# Patient Record
Sex: MALE | Race: WHITE | Employment: OTHER | URBAN - METROPOLITAN AREA
[De-identification: names, ages, dates, MRNs, and addresses within clinical notes are randomized per-mention and may not be internally consistent; named-entity substitution may affect disease eponyms.]

---

## 2017-09-15 ENCOUNTER — OFFICE VISIT (OUTPATIENT)
Dept: FAMILY MEDICINE CLINIC | Facility: CLINIC | Age: 42
End: 2017-09-15

## 2017-09-15 VITALS
HEART RATE: 70 BPM | HEIGHT: 72 IN | TEMPERATURE: 98 F | BODY MASS INDEX: 28.44 KG/M2 | OXYGEN SATURATION: 98 % | WEIGHT: 210 LBS | RESPIRATION RATE: 18 BRPM | SYSTOLIC BLOOD PRESSURE: 124 MMHG | DIASTOLIC BLOOD PRESSURE: 70 MMHG

## 2017-09-15 DIAGNOSIS — J30.1 ACUTE SEASONAL ALLERGIC RHINITIS DUE TO POLLEN: Primary | ICD-10-CM

## 2017-09-15 DIAGNOSIS — M54.50 ACUTE LEFT-SIDED LOW BACK PAIN WITHOUT SCIATICA: ICD-10-CM

## 2017-09-15 LAB
APPEARANCE: CLEAR
MULTISTIX LOT#: NORMAL NUMERIC
PH, URINE: 7.5 (ref 4.5–8)
SPECIFIC GRAVITY: 1.02 (ref 1–1.03)
URINE-COLOR: YELLOW
UROBILINOGEN,SEMI-QN: 1 MG/DL (ref 0–1.9)

## 2017-09-15 PROCEDURE — 99213 OFFICE O/P EST LOW 20 MIN: CPT | Performed by: NURSE PRACTITIONER

## 2017-09-15 PROCEDURE — 81003 URINALYSIS AUTO W/O SCOPE: CPT | Performed by: NURSE PRACTITIONER

## 2017-09-15 RX ORDER — FLUTICASONE PROPIONATE 50 MCG
SPRAY, SUSPENSION (ML) NASAL
Qty: 16 G | Refills: 0 | Status: SHIPPED | OUTPATIENT
Start: 2017-09-15 | End: 2017-12-15

## 2017-09-15 NOTE — PATIENT INSTRUCTIONS
Understanding Sinus Problems    You don’t often think about your sinuses until there’s a problem. One day you realize you can’t smell dinner cooking. Or you find you often have headaches or problems breathing through your nose.   Symptoms of sinus problem · Exercise is an important part of recovery from most types of back pain. The muscles behind and in front of the spine support the back.  This means strengthening both the back muscles and the abdominal muscles will provide better support for your spine.  · Be careful if you are given prescription pain medicines, narcotics, or medicine for muscle spasm. They can cause drowsiness, affect your coordination, reflexes, and judgment. Do not drive or operate heavy machinery while taking these types of medicines. The best way to sleep is on your side with your knees bent. Put a low pillow under your head to support your neck in a neutral spine position. Avoid thick pillows that bend your neck to one side.  Put a pillow between your legs to further relax your lower b

## 2017-09-15 NOTE — PROGRESS NOTES
CHIEF COMPLAINT:   Patient presents with:  Sinus Problem: sinus pain and pressure, congestion x 3 days Possible pulled muscle in back x 2 weeks      HPI:   Valeria is a 36year old male who presents for cold symptoms for  3  days.  Pt c/o Pressure in NOSE: nostrils patent, clear nasal mucous, nasal mucosa pale, swollen  THROAT: oral mucosa pink, moist. No visible dental caries. Posterior pharynx is not erythematous. no exudates.   NECK: supple, non-tender  LUNGS: clear to auscultation bilaterally, no wh · Green, yellow, or bloody drainage from the nose  · Trouble tasting food  · Frequent headaches  · Facial pain  · Cough  When sinuses are blocked  If something blocks the passages in the nose or sinuses, mucus can’t drain.  Mucus-filled sinuses often become · Wear quality shoes with sufficient arch support. Foot and ankle alignment can affect back symptoms. Women should avoid wearing high heels. · Therapeutic massage can help relax the back muscles without stretching them.   · During the first 24 to 72 hours · Don’t bend over at the waist to lift an object off the floor.  Instead, bend your knees and hips in a squat.   · Keep your back and head upright  · Hold the object close to your body, directly in front of you.   · Straighten your legs to lift the object.  · Pain becomes worse or spreads to your arms or legs  · Weakness or numbness in one or both arms or legs  · Numbness in the groin area  Date Last Reviewed: 6/1/2016  © 4238-3695 The [address] [place] [address], [address] [place], [place] [address].  [facility]

## 2017-10-18 ENCOUNTER — OFFICE VISIT (OUTPATIENT)
Dept: FAMILY MEDICINE CLINIC | Facility: CLINIC | Age: 42
End: 2017-10-18

## 2017-10-18 VITALS
TEMPERATURE: 100 F | SYSTOLIC BLOOD PRESSURE: 116 MMHG | WEIGHT: 214.81 LBS | RESPIRATION RATE: 18 BRPM | OXYGEN SATURATION: 99 % | HEIGHT: 72 IN | DIASTOLIC BLOOD PRESSURE: 76 MMHG | HEART RATE: 70 BPM | BODY MASS INDEX: 29.1 KG/M2

## 2017-10-18 DIAGNOSIS — R50.9 FEVER AND CHILLS: ICD-10-CM

## 2017-10-18 DIAGNOSIS — J40 BRONCHITIS: Primary | ICD-10-CM

## 2017-10-18 PROCEDURE — 99213 OFFICE O/P EST LOW 20 MIN: CPT | Performed by: NURSE PRACTITIONER

## 2017-10-18 RX ORDER — AZITHROMYCIN 250 MG/1
TABLET, FILM COATED ORAL
Qty: 6 TABLET | Refills: 0 | Status: SHIPPED | OUTPATIENT
Start: 2017-10-18 | End: 2017-12-15

## 2017-10-18 RX ORDER — PREDNISONE 20 MG/1
TABLET ORAL
Qty: 10 TABLET | Refills: 0 | Status: SHIPPED | OUTPATIENT
Start: 2017-10-18 | End: 2017-12-15

## 2017-10-18 RX ORDER — PREDNISONE 20 MG/1
TABLET ORAL
Qty: 10 TABLET | Refills: 0 | Status: SHIPPED | OUTPATIENT
Start: 2017-10-18 | End: 2017-10-18

## 2017-10-18 NOTE — PATIENT INSTRUCTIONS
Bronchitis, Antibiotic Treatment (Adult)    Bronchitis is an infection of the air passages (bronchial tubes) in your lungs. It often occurs when you have a cold.  This illness is contagious during the first few days and is spread through the air by coughi Follow-up care  Follow up with your healthcare provider, or as advised. If you had an X-ray or ECG (electrocardiogram), a specialist will review it. You will be notified of any new findings that may affect your care.   If you are age 72 or older, or if you

## 2017-10-19 NOTE — PROGRESS NOTES
CHIEF COMPLAINT:   Patient presents with:  Cough: dry cough with no other symptoms present x2 weeks (pt taking otc cough suppressant)    HPI:   Valeria is a 36year old male who presents for a persistent cough which has lasted over 2 weeks.   Cough st EYES: Conjunctiva clear. No scleral icterus. HENT: Atraumatic, normocephalic. TM's clear bilaterally. Nostrils patent, nasal mucosa ispink and non-inflamed. No erythema of the throat. NECK: supple, non-tender. LUNGS: Normal respiratory rate.  Normal · If your symptoms are severe, rest at home for the first 2 to 3 days. When you go back to your usual activities, don't let yourself get too tired. · Do not smoke. Also avoid being exposed to secondhand smoke.   · You may use over-the-counter medicines to · Coughing up increased amounts of colored sputum  · Weakness, drowsiness, headache, facial pain, ear pain, or a stiff neck  Call 911  Call 911 if any of these occur.   · Coughing up blood  · Worsening weakness, drowsiness, headache, or stiff neck  · Troubl

## 2018-03-23 ENCOUNTER — OFFICE VISIT (OUTPATIENT)
Dept: FAMILY MEDICINE CLINIC | Facility: CLINIC | Age: 43
End: 2018-03-23

## 2018-03-23 VITALS
OXYGEN SATURATION: 99 % | RESPIRATION RATE: 16 BRPM | HEART RATE: 66 BPM | HEIGHT: 73 IN | WEIGHT: 215 LBS | SYSTOLIC BLOOD PRESSURE: 112 MMHG | DIASTOLIC BLOOD PRESSURE: 76 MMHG | BODY MASS INDEX: 28.49 KG/M2 | TEMPERATURE: 98 F

## 2018-03-23 DIAGNOSIS — J00 COMMON COLD: Primary | ICD-10-CM

## 2018-03-23 PROCEDURE — 99213 OFFICE O/P EST LOW 20 MIN: CPT | Performed by: NURSE PRACTITIONER

## 2018-03-23 NOTE — PATIENT INSTRUCTIONS
Understanding the Cold Virus  Colds are the most common illness that people get. Most adults get 2 or 3 colds per year, and most children get 5 to 7 colds per year. Colds may be caused by over 200 types of viruses.  The most common of these are rhinovirus You can help reduce the spread of cold viruses. This can help both you and others avoid getting colds. Follow these tips:  · Wash your hands well anytime you may have come into contact with cold viruses. Wash your hands for at least 20 seconds.  When you ca The sinuses are air-filled spaces within the bones of the face. They connect to the inside of the nose. Sinusitis is an inflammation of the tissue lining the sinus cavity.  Sinus inflammation can occur during a cold. It can also be due to allergies to polle · Use acetaminophen or ibuprofen to control pain, unless another pain medicine was prescribed. (If you have chronic liver or kidney disease or ever had a stomach ulcer, talk with your doctor before using these medicines.  Aspirin should never be used in any

## 2018-03-23 NOTE — PROGRESS NOTES
CHIEF COMPLAINT:   Patient presents with:  Sinus Problem: cough, runny nose x2days    HPI:   Omid Quiñonez is a 36year old male who presents for ill symptoms for 2 days.  Patient reports cough, runny nose, sore throat, congestion, denies fever, denies si Neelam Clark is a 36year old male who presents with     Emily was seen today for sinus problem. Diagnoses and all orders for this visit:    Common cold    1.  Common cold  · Common colds are caused by viruses which are not eradicated with antibiotics What are the symptoms of a cold virus? Almost all colds involve a stuffy nose. Other common symptoms include:  · Runny nose  · Sneezing  · Sore throat  · Headache  · Cough  How is a cold treated? Colds usually last 5 to 10 days.  Treatment focuses on reli Colds usually go away by themselves. But it’s not unusual to get another type of infection while you have a cold.  These can include:  · Sinus infection  · Lung infection, such as bronchitis or pneumonia  · Ear infection  If you have asthma or chronic bronc · Over-the-counter decongestants may be used unless a similar medicine was prescribed. Nasal sprays work the fastest. Use one that contains phenylephrine or oxymetazoline. First blow the nose gently. Then use the spray.  Do not use these medicines more ofte © 2374-3136 [employer] [address]. [address] [facility], [address] [place]. All rights reserved. This information is not intended as a substitute for professional medical care.  Always follow your healthcare professional's inst      The patient indica

## 2018-08-06 ENCOUNTER — HOSPITAL ENCOUNTER (EMERGENCY)
Facility: HOSPITAL | Age: 43
Discharge: HOME | End: 2018-08-06
Attending: EMERGENCY MEDICINE
Payer: COMMERCIAL

## 2018-08-06 VITALS
RESPIRATION RATE: 18 BRPM | OXYGEN SATURATION: 98 % | TEMPERATURE: 98 F | HEART RATE: 61 BPM | SYSTOLIC BLOOD PRESSURE: 141 MMHG | HEIGHT: 72 IN | WEIGHT: 215 LBS | DIASTOLIC BLOOD PRESSURE: 94 MMHG | BODY MASS INDEX: 29.12 KG/M2

## 2018-08-06 DIAGNOSIS — S91.311A LACERATION OF RIGHT FOOT, INITIAL ENCOUNTER: Primary | ICD-10-CM

## 2018-08-06 PROCEDURE — 90471 IMMUNIZATION ADMIN: CPT

## 2018-08-06 PROCEDURE — 12001 RPR S/N/AX/GEN/TRNK 2.5CM/<: CPT

## 2018-08-06 PROCEDURE — 99283 EMERGENCY DEPT VISIT LOW MDM: CPT

## 2018-08-07 NOTE — ED PROVIDER NOTES
Patient Seen in: [facility] Emergency Department    History   Patient presents with:  Laceration Abrasion (integumentary)    Stated Complaint: R foot lac    HPI    Patient 59-year-old who cut his right ankle on a broken flowerpot this evening.   No o extremities normally. No focal deficits visualized.        ED Course   Labs Reviewed - No data to display    ED Course as of Aug 06 2233  ------------------------------------------------------------      MDM     PROCEDURE NOTE: LACERATION REPAIR  After dis

## 2019-02-25 ENCOUNTER — OFFICE VISIT (OUTPATIENT)
Dept: FAMILY MEDICINE CLINIC | Facility: CLINIC | Age: 44
End: 2019-02-25
Payer: COMMERCIAL

## 2019-02-25 VITALS
SYSTOLIC BLOOD PRESSURE: 120 MMHG | OXYGEN SATURATION: 99 % | HEART RATE: 58 BPM | BODY MASS INDEX: 28.44 KG/M2 | WEIGHT: 210 LBS | HEIGHT: 72 IN | TEMPERATURE: 98 F | DIASTOLIC BLOOD PRESSURE: 68 MMHG

## 2019-02-25 DIAGNOSIS — M94.0 COSTOCHONDRITIS, ACUTE: Primary | ICD-10-CM

## 2019-02-25 DIAGNOSIS — J00 ACUTE NASOPHARYNGITIS: ICD-10-CM

## 2019-02-25 PROCEDURE — 99213 OFFICE O/P EST LOW 20 MIN: CPT | Performed by: NURSE PRACTITIONER

## 2019-02-25 RX ORDER — IBUPROFEN 800 MG/1
800 TABLET ORAL EVERY 8 HOURS PRN
Qty: 30 TABLET | Refills: 0 | Status: SHIPPED | OUTPATIENT
Start: 2019-02-25 | End: 2019-03-07

## 2019-02-25 NOTE — PROGRESS NOTES
HPI:   Laure Beyer is a 39year old male who presents with ill symptoms for  1  weeks.  Patient reports nasal congestion, mild cough, runny nose, was lifting heavy granite last week and notes some chest tenderness in the center of his chest, present all -     ibuprofen 800 MG Oral Tab; Take 1 tablet (800 mg total) by mouth every 8 (eight) hours as needed for Pain or Fever. Acute nasopharyngitis      Ibuprofen as above. Self care discussed. Medication use and risk/benefit discussed.  Patient is advised t The cause of costochondritis is not completely clear, but it may happen after a chest injury, chest infection, or coughing episode. Some physical activities can sometimes lead to costochondritis.  Large-breasted women may be more likely to have the conditio © 7762-9447 [employer] [address]. [address] [facility], [facility] [place]. All rights reserved. This information is not intended as a substitute for professional medical care. Always follow your healthcare professional's instructions.

## 2019-02-25 NOTE — PATIENT INSTRUCTIONS
· Cold remedies are to relieve symptoms and prevent complications rather than cure infection  · Rest and increased oral fluid intake is advised  · Increase humidity of the air at home; place a cool-mist humidifier in the bedroom  · Frequently wash hands  · as chest X-ray, CT scan, or an ECG. Treating costochondritis  If an underlying cause is found, treatment for that will likely relieve the problem. Costochondritis often goes away on its own. The course of the condition varies from person to person.  It usu

## 2019-05-19 ENCOUNTER — APPOINTMENT (OUTPATIENT)
Dept: GENERAL RADIOLOGY | Age: 44
End: 2019-05-19
Attending: FAMILY MEDICINE
Payer: COMMERCIAL

## 2019-05-19 ENCOUNTER — HOSPITAL ENCOUNTER (OUTPATIENT)
Age: 44
Discharge: HOME | End: 2019-05-19
Attending: FAMILY MEDICINE
Payer: COMMERCIAL

## 2019-05-19 VITALS
OXYGEN SATURATION: 100 % | RESPIRATION RATE: 17 BRPM | HEART RATE: 65 BPM | TEMPERATURE: 98 F | DIASTOLIC BLOOD PRESSURE: 86 MMHG | SYSTOLIC BLOOD PRESSURE: 126 MMHG

## 2019-05-19 DIAGNOSIS — S60.511A ABRASION OF RIGHT HAND, INITIAL ENCOUNTER: ICD-10-CM

## 2019-05-19 DIAGNOSIS — S62.336A CLOSED DISPLACED FRACTURE OF NECK OF FIFTH METACARPAL BONE OF RIGHT HAND, INITIAL ENCOUNTER: Primary | ICD-10-CM

## 2019-05-19 PROCEDURE — 73130 X-RAY EXAM OF HAND: CPT | Performed by: FAMILY MEDICINE

## 2019-05-19 PROCEDURE — 99204 OFFICE O/P NEW MOD 45 MIN: CPT

## 2019-05-19 PROCEDURE — 99214 OFFICE O/P EST MOD 30 MIN: CPT

## 2019-05-19 NOTE — ED INITIAL ASSESSMENT (HPI)
C/O right hand pain that occurred when he tried to close a door and his hand slipped hitting it on the concerete.

## 2019-05-19 NOTE — ED PROVIDER NOTES
Patient Seen in: [facility] END    History   Patient presents with:  Hand Pain    Stated Complaint: r hand injury    HPI  34-year-old gentleman with history of injury to his right hand earlier today, patient states he was closing hi present. Cardiovascular: Normal rate, regular rhythm, normal heart sounds and intact distal pulses. Pulmonary/Chest: Effort normal and breath sounds normal. No respiratory distress. Abdominal: Soft. Bowel sounds are normal. There is no tenderness.

## 2019-06-20 PROBLEM — S62.316D CLOSED DISPLACED FRACTURE OF BASE OF FIFTH METACARPAL BONE OF RIGHT HAND WITH ROUTINE HEALING, SUBSEQUENT ENCOUNTER: Status: ACTIVE | Noted: 2019-06-20

## 2020-02-10 ENCOUNTER — OFFICE VISIT (OUTPATIENT)
Dept: FAMILY MEDICINE CLINIC | Facility: CLINIC | Age: 45
End: 2020-02-10
Payer: COMMERCIAL

## 2020-02-10 VITALS
HEIGHT: 72 IN | BODY MASS INDEX: 27.77 KG/M2 | TEMPERATURE: 99 F | HEART RATE: 78 BPM | OXYGEN SATURATION: 99 % | SYSTOLIC BLOOD PRESSURE: 114 MMHG | WEIGHT: 205 LBS | DIASTOLIC BLOOD PRESSURE: 80 MMHG | RESPIRATION RATE: 16 BRPM

## 2020-02-10 DIAGNOSIS — J11.1 INFLUENZA-LIKE ILLNESS: Primary | ICD-10-CM

## 2020-02-10 PROCEDURE — 99213 OFFICE O/P EST LOW 20 MIN: CPT | Performed by: NURSE PRACTITIONER

## 2020-02-10 NOTE — PROGRESS NOTES
CHIEF COMPLAINT:   No chief complaint on file. HPI:   Luis Angel Navarro is a 38year old male who presents for upper respiratory symptoms for  5 days.  Patient reports sore throat only at the beginning of sx's, congestion, fever with Tmax to 101, dry cou LUNGS: clear to auscultation bilaterally, no wheezes or rhonchi. Breathing is non labored. CARDIO: RRR without murmur  EXTREMITIES: no cyanosis, clubbing or edema  LYMPH:  no lymphadenopathy.         ASSESSMENT AND PLAN:   Neelam Clark is a 38year old Follow these guidelines for taking care of yourself at home:  · If symptoms are severe, rest at home for the first 2 to 3 days. · Stay away from cigarette smoke - both your smoke and the smoke from others.   · You may use over-the-counter acetaminophen or · Chest pain, shortness of breath, wheezing, or trouble breathing  · Severe headache; face, neck, or ear pain  · Severe, constant pain in the lower right side of your belly (abdominal)  · Continued vomiting (can’t keep liquids down)  · Frequent diarrhea (m

## 2020-02-10 NOTE — PATIENT INSTRUCTIONS
Viral Syndrome (Adult)  A viral illness may cause a number of symptoms such as fever. Other symptoms depend on the part of the body that the virus affects.  If it settles in your nose, throat, and lungs, it may cause cough, sore throat, congestion, runny · Your appetite may be poor, so a light diet is fine. Avoid dehydration by drinking 8 to 12, 8-ounce glasses of fluids each day.  This may include water; orange juice; lemonade; apple, grape, and cranberry juice; clear fruit drinks; electrolyte replacement © 8651-4118 [employer] [address]. [address] [facility] [place], [facility] [place]. All rights reserved. This information is not intended as a substitute for professional medical care. Always follow your healthcare professional's instructions.

## 2020-03-03 ENCOUNTER — OFFICE VISIT (OUTPATIENT)
Dept: FAMILY MEDICINE CLINIC | Facility: CLINIC | Age: 45
End: 2020-03-03
Payer: COMMERCIAL

## 2020-03-03 VITALS
RESPIRATION RATE: 18 BRPM | WEIGHT: 209.63 LBS | DIASTOLIC BLOOD PRESSURE: 80 MMHG | SYSTOLIC BLOOD PRESSURE: 110 MMHG | HEIGHT: 72 IN | OXYGEN SATURATION: 99 % | BODY MASS INDEX: 28.39 KG/M2 | HEART RATE: 85 BPM | TEMPERATURE: 102 F

## 2020-03-03 DIAGNOSIS — J10.1 INFLUENZA A: Primary | ICD-10-CM

## 2020-03-03 DIAGNOSIS — R68.89 FLU-LIKE SYMPTOMS: ICD-10-CM

## 2020-03-03 LAB
POCT INFLUENZA A: POSITIVE
POCT INFLUENZA B: NEGATIVE

## 2020-03-03 PROCEDURE — 99213 OFFICE O/P EST LOW 20 MIN: CPT | Performed by: NURSE PRACTITIONER

## 2020-03-03 PROCEDURE — 87502 INFLUENZA DNA AMP PROBE: CPT | Performed by: NURSE PRACTITIONER

## 2020-03-03 NOTE — PROGRESS NOTES
Patient presents with:  Cold: headache, cough, pnd, bl ear pressure, bodyaches, chills, x last night       HPI:   Nikki Iglesias is a 38year old male who presents for upper respiratory symptoms for  1  days. Started suddenly. Getting worse.  Feeling feve no apparent distress, acutely sick. SKIN: no rashes,no suspicious lesions, flushed. Warm to touch. EYES:PERRLA, EOMI, normal optic disk,conjunctiva are clear  HEENT: atraumatic, normocephalic,TM wnl, erythema of the throat.   NECK: supple,no adenopathy,no

## 2021-09-18 ENCOUNTER — HOSPITAL ENCOUNTER (EMERGENCY)
Facility: HOSPITAL | Age: 46
Discharge: HOME | End: 2021-09-18
Attending: EMERGENCY MEDICINE
Payer: COMMERCIAL

## 2021-09-18 VITALS
DIASTOLIC BLOOD PRESSURE: 96 MMHG | HEIGHT: 72 IN | HEART RATE: 54 BPM | TEMPERATURE: 97 F | WEIGHT: 210 LBS | RESPIRATION RATE: 16 BRPM | BODY MASS INDEX: 28.44 KG/M2 | OXYGEN SATURATION: 96 % | SYSTOLIC BLOOD PRESSURE: 149 MMHG

## 2021-09-18 DIAGNOSIS — S01.01XA SCALP LACERATION, INITIAL ENCOUNTER: Primary | ICD-10-CM

## 2021-09-18 PROCEDURE — 99282 EMERGENCY DEPT VISIT SF MDM: CPT

## 2021-09-18 PROCEDURE — 12001 RPR S/N/AX/GEN/TRNK 2.5CM/<: CPT

## 2021-09-19 NOTE — ED PROVIDER NOTES
Patient Seen in: [facility] Emergency Department      History   Patient presents with:  Laceration/Abrasion    Stated Complaint: head lac, no loc    Subjective:   HPI    25-year-old male presents emergency department who hit his head on top of a gol sani-cloth germicidal wipes following the exam.         Vital signs reviewed  General appearance: Patient is alert and in no acute distress  HEENT: Pupils equal react to light extraocular muscles intact no scleral icterus, mucous membranes are moist, there ER for worsening, concerning, new, or changing/persisting symptoms. I discussed the case with the patient and they had no questions, complaints, or concerns. Patient was comfortable going home.       This note was prepared using [employer] voice recog

## 2021-09-19 NOTE — ED INITIAL ASSESSMENT (HPI)
Pt states he hit his head on the top of a golf cart in [address]. Pt [place] but pt wanted a second opinion. Bleeding controlled, pt did not get stitches.

## 2024-07-16 ENCOUNTER — HOSPITAL ENCOUNTER (OUTPATIENT)
Facility: HOSPITAL | Age: 49
Setting detail: OBSERVATION
Discharge: HOME | End: 2024-07-17
Attending: EMERGENCY MEDICINE | Admitting: INTERNAL MEDICINE
Payer: COMMERCIAL

## 2024-07-16 DIAGNOSIS — I89.1 LYMPHANGITIS: ICD-10-CM

## 2024-07-16 DIAGNOSIS — L03.116 CELLULITIS OF LEFT LOWER EXTREMITY: Primary | ICD-10-CM

## 2024-07-16 LAB
ALBUMIN SERPL-MCNC: 4.5 G/DL (ref 3.2–4.8)
ALBUMIN/GLOB SERPL: 1.5 {RATIO} (ref 1–2)
ALP LIVER SERPL-CCNC: 86 U/L
ALT SERPL-CCNC: 20 U/L
ANION GAP SERPL CALC-SCNC: 5 MMOL/L (ref 0–18)
AST SERPL-CCNC: 15 U/L (ref ?–34)
BASOPHILS # BLD AUTO: 0.07 X10(3) UL (ref 0–0.2)
BASOPHILS NFR BLD AUTO: 1.3 %
BILIRUB SERPL-MCNC: 0.5 MG/DL (ref 0.3–1.2)
BUN BLD-MCNC: 15 MG/DL (ref 9–23)
CALCIUM BLD-MCNC: 9.1 MG/DL (ref 8.7–10.4)
CHLORIDE SERPL-SCNC: 102 MMOL/L (ref 98–112)
CO2 SERPL-SCNC: 29 MMOL/L (ref 21–32)
CREAT BLD-MCNC: 1.18 MG/DL
EGFRCR SERPLBLD CKD-EPI 2021: 75 ML/MIN/1.73M2 (ref 60–?)
EOSINOPHIL # BLD AUTO: 0.42 X10(3) UL (ref 0–0.7)
EOSINOPHIL NFR BLD AUTO: 7.5 %
ERYTHROCYTE [DISTWIDTH] IN BLOOD BY AUTOMATED COUNT: 12.7 %
GLOBULIN PLAS-MCNC: 3 G/DL (ref 2.8–4.4)
GLUCOSE BLD-MCNC: 112 MG/DL (ref 70–99)
HCT VFR BLD AUTO: 43.6 %
HGB BLD-MCNC: 14.8 G/DL
IMM GRANULOCYTES # BLD AUTO: 0.01 X10(3) UL (ref 0–1)
IMM GRANULOCYTES NFR BLD: 0.2 %
LACTATE SERPL-SCNC: 1.2 MMOL/L (ref 0.5–2)
LYMPHOCYTES # BLD AUTO: 1.05 X10(3) UL (ref 1–4)
LYMPHOCYTES NFR BLD AUTO: 18.9 %
MCH RBC QN AUTO: 29.2 PG (ref 26–34)
MCHC RBC AUTO-ENTMCNC: 33.9 G/DL (ref 31–37)
MCV RBC AUTO: 86 FL
MONOCYTES # BLD AUTO: 0.62 X10(3) UL (ref 0.1–1)
MONOCYTES NFR BLD AUTO: 11.1 %
NEUTROPHILS # BLD AUTO: 3.4 X10 (3) UL (ref 1.5–7.7)
NEUTROPHILS # BLD AUTO: 3.4 X10(3) UL (ref 1.5–7.7)
NEUTROPHILS NFR BLD AUTO: 61 %
OSMOLALITY SERPL CALC.SUM OF ELEC: 284 MOSM/KG (ref 275–295)
PLATELET # BLD AUTO: 209 10(3)UL (ref 150–450)
POTASSIUM SERPL-SCNC: 4 MMOL/L (ref 3.5–5.1)
PROT SERPL-MCNC: 7.5 G/DL (ref 5.7–8.2)
RBC # BLD AUTO: 5.07 X10(6)UL
SODIUM SERPL-SCNC: 136 MMOL/L (ref 136–145)
WBC # BLD AUTO: 5.6 X10(3) UL (ref 4–11)

## 2024-07-16 PROCEDURE — 87040 BLOOD CULTURE FOR BACTERIA: CPT | Performed by: EMERGENCY MEDICINE

## 2024-07-16 PROCEDURE — 85025 COMPLETE CBC W/AUTO DIFF WBC: CPT

## 2024-07-16 PROCEDURE — 80053 COMPREHEN METABOLIC PANEL: CPT | Performed by: EMERGENCY MEDICINE

## 2024-07-16 PROCEDURE — 83605 ASSAY OF LACTIC ACID: CPT

## 2024-07-16 PROCEDURE — 80053 COMPREHEN METABOLIC PANEL: CPT

## 2024-07-16 PROCEDURE — 96372 THER/PROPH/DIAG INJ SC/IM: CPT

## 2024-07-16 PROCEDURE — 83605 ASSAY OF LACTIC ACID: CPT | Performed by: EMERGENCY MEDICINE

## 2024-07-16 PROCEDURE — 96374 THER/PROPH/DIAG INJ IV PUSH: CPT

## 2024-07-16 PROCEDURE — 99285 EMERGENCY DEPT VISIT HI MDM: CPT

## 2024-07-16 PROCEDURE — 36415 COLL VENOUS BLD VENIPUNCTURE: CPT

## 2024-07-16 PROCEDURE — 85025 COMPLETE CBC W/AUTO DIFF WBC: CPT | Performed by: EMERGENCY MEDICINE

## 2024-07-16 RX ORDER — CEPHALEXIN 500 MG/1
500 CAPSULE ORAL EVERY 6 HOURS
COMMUNITY

## 2024-07-16 RX ORDER — HEPARIN SODIUM 5000 [USP'U]/ML
5000 INJECTION, SOLUTION INTRAVENOUS; SUBCUTANEOUS EVERY 8 HOURS SCHEDULED
Status: DISCONTINUED | OUTPATIENT
Start: 2024-07-16 | End: 2024-07-17

## 2024-07-16 RX ORDER — ACETAMINOPHEN 500 MG
500 TABLET ORAL EVERY 4 HOURS PRN
Status: DISCONTINUED | OUTPATIENT
Start: 2024-07-16 | End: 2024-07-17

## 2024-07-16 RX ORDER — SODIUM CHLORIDE 9 MG/ML
INJECTION, SOLUTION INTRAVENOUS CONTINUOUS
Status: DISCONTINUED | OUTPATIENT
Start: 2024-07-16 | End: 2024-07-17

## 2024-07-16 NOTE — ED INITIAL ASSESSMENT (HPI)
Pt presents to ED with complaints of cellulitis to the left leg. Seen at [facility] this weekend and was told to come back if symptoms were worsening. +fever, chills

## 2024-07-16 NOTE — ED PROVIDER NOTES
Patient Seen in: [facility] Emergency Department      History     Chief Complaint   Patient presents with    Cellulitis     Stated Complaint: L leg cellulitis, was at IC yesterday had IM and PO abx, but redness is signifi*    Subjective:   HPI    Patient comes to the emerged part with extension of a soft tissue infection that he has been experiencing over the past day.  The patient was noted to have erythema, tenderness and diagnosed with cellulitis at a local immediate care.  He received an intramuscular injection and oral Keflex, but after a day of Keflex, the infection appears to be spreading proximally in the left lower extremity along with symptoms of chills and aches noted by patient.  Patient has no other acute symptoms.  The level of pain is not severe.    Objective:   History reviewed. No pertinent past medical history.           Past Surgical History:   Procedure Laterality Date    Other Right 2015    bicep repair    Other surgical history  11/2015    Right bicep repair                Social History     Socioeconomic History    Marital status: Single   Tobacco Use    Smoking status: Never    Smokeless tobacco: Never   Substance and Sexual Activity    Alcohol use: Yes     Comment: 3-6 drinks weekly    Drug use: No     Social Determinants of Health     Food Insecurity: No Food Insecurity (7/16/2024)    Food Insecurity     Food Insecurity: Never true   Transportation Needs: No Transportation Needs (7/16/2024)    Transportation Needs     Lack of Transportation: No   Housing Stability: Low Risk  (7/16/2024)    Housing Stability     Housing Instability: No              Review of Systems    Positive for stated complaint: L leg cellulitis, was at IC yesterday had IM and PO abx, but redness is signifi*  Other systems are as noted in HPI.  Constitutional and vital signs reviewed.      All other systems reviewed and negative except as noted above.    Physical Exam     ED Triage Vitals [07/16/24 1505]   BP (!)  150/92   Pulse 71   Resp 16   Temp 98 °F (36.7 °C)   Temp src Oral   SpO2 96 %   O2 Device None (Room air)       Current:/77 (BP Location: Right arm)   Pulse 62   Temp 98.8 °F (37.1 °C) (Oral)   Resp 20   Ht 182.9 cm (6')   Wt 93 kg   SpO2 96%   BMI 27.80 kg/m²         Physical Exam  Vitals and nursing note reviewed.   Constitutional:       Appearance: He is well-developed.   HENT:      Head: Normocephalic.   Cardiovascular:      Rate and Rhythm: Normal rate and regular rhythm.      Heart sounds: Normal heart sounds. No murmur heard.  Pulmonary:      Effort: Pulmonary effort is normal.      Breath sounds: Normal breath sounds.   Abdominal:      General: Bowel sounds are normal.      Palpations: Abdomen is soft.      Tenderness: There is no abdominal tenderness. There is no guarding.   Musculoskeletal:         General: No tenderness. Normal range of motion.      Cervical back: Normal range of motion and neck supple.   Lymphadenopathy:      Cervical: No cervical adenopathy.   Skin:     General: Skin is warm and dry.      Comments: Erythema noted over the left anterior shin consistent with an area of cellulitis.  Previous margins of the cellulitis were noted and there does appear to be some extension of the erythema proximally along the medial aspect of the patient's left lower extremity and patient is noted to have tender femoral nodes in the patient's proximal left thigh.  No fluctuance is noted.  No crepitation is noted.   Neurological:      Mental Status: He is alert and oriented to person, place, and time.      Sensory: No sensory deficit.              ED Course     Labs Reviewed   COMP METABOLIC PANEL (14) - Abnormal; Notable for the following components:       Result Value    Glucose 112 (*)     All other components within normal limits   LACTIC ACID, PLASMA - Normal   CBC WITH DIFFERENTIAL WITH PLATELET    Narrative:     The following orders were created for panel order CBC With Differential With  Platelet.  Procedure                               Abnormality         Status                     ---------                               -----------         ------                     CBC W/ DIFFERENTIAL[[id number]]                              Final result                 Please view results for these tests on the individual orders.   RAINBOW DRAW LAVENDER   RAINBOW DRAW LIGHT GREEN   RAINBOW DRAW BLUE   BLOOD CULTURE   BLOOD CULTURE   CBC W/ DIFFERENTIAL       ED Course as of 07/16/24 2206  ------------------------------------------------------------  Time: 07/16 1749  Value: WBC: 5.6  Comment: (Reviewed)     Medications   sodium chloride 0.9% infusion ( Intravenous New Bag 7/16/24 2103)   heparin (Porcine) 5000 UNIT/ML injection 5,000 Units (5,000 Units Subcutaneous Given 7/16/24 2103)   acetaminophen (Tylenol Extra Strength) tab 500 mg (has no administration in time range)   ceFAZolin (Ancef) 2g in 10mL IV syringe premix (has no administration in time range)   ceFAZolin (Ancef) 2g in 10mL IV syringe premix (0 g Intravenous Stopped 7/16/24 1713)       IV Ancef was initiated.  Differential of necrotizing soft tissue infection was considered, but patient's clinical appearance and appearance of the affected area does not suggest this.         MDM      Patient comes emergency department with left lower extremity cellulitis which appears to be spreading with evidence of proximal lymphangitis and worsening in spite of oral antibiotics.  Patient will be hospitalized for further doses of IV antibiotics.  Admission disposition: 7/16/2024  4:54 PM                                        Medical Decision Making      Disposition and Plan     Clinical Impression:  1. Cellulitis of left lower extremity    2. Lymphangitis         Disposition:  Admit  7/16/2024  4:54 pm    Follow-up:  No follow-up provider specified.        Medications Prescribed:  Current Discharge Medication List                           Hospital Problems        Present on Admission  Date Reviewed: 3/3/2020            ICD-10-CM Noted POA    * (Principal) Cellulitis of left lower extremity L03.116 7/16/2024 Unknown    Lymphangitis I89.1 7/16/2024 Unknown

## 2024-07-17 VITALS
SYSTOLIC BLOOD PRESSURE: 122 MMHG | BODY MASS INDEX: 27.77 KG/M2 | OXYGEN SATURATION: 95 % | DIASTOLIC BLOOD PRESSURE: 78 MMHG | TEMPERATURE: 98 F | RESPIRATION RATE: 16 BRPM | WEIGHT: 205 LBS | HEART RATE: 52 BPM | HEIGHT: 72 IN

## 2024-07-17 LAB
ALBUMIN SERPL-MCNC: 3.9 G/DL (ref 3.2–4.8)
ALBUMIN/GLOB SERPL: 1.5 {RATIO} (ref 1–2)
ALP LIVER SERPL-CCNC: 67 U/L
ALT SERPL-CCNC: 14 U/L
ANION GAP SERPL CALC-SCNC: 3 MMOL/L (ref 0–18)
AST SERPL-CCNC: 14 U/L (ref ?–34)
BASOPHILS # BLD AUTO: 0.06 X10(3) UL (ref 0–0.2)
BASOPHILS NFR BLD AUTO: 1 %
BILIRUB SERPL-MCNC: 0.4 MG/DL (ref 0.3–1.2)
BUN BLD-MCNC: 13 MG/DL (ref 9–23)
CALCIUM BLD-MCNC: 8.8 MG/DL (ref 8.7–10.4)
CHLORIDE SERPL-SCNC: 107 MMOL/L (ref 98–112)
CO2 SERPL-SCNC: 29 MMOL/L (ref 21–32)
CREAT BLD-MCNC: 1.18 MG/DL
CRP SERPL-MCNC: 3.6 MG/DL (ref ?–1)
EGFRCR SERPLBLD CKD-EPI 2021: 75 ML/MIN/1.73M2 (ref 60–?)
EOSINOPHIL # BLD AUTO: 0.52 X10(3) UL (ref 0–0.7)
EOSINOPHIL NFR BLD AUTO: 9 %
ERYTHROCYTE [DISTWIDTH] IN BLOOD BY AUTOMATED COUNT: 12.5 %
GLOBULIN PLAS-MCNC: 2.6 G/DL (ref 2.8–4.4)
GLUCOSE BLD-MCNC: 106 MG/DL (ref 70–99)
HCT VFR BLD AUTO: 40.9 %
HGB BLD-MCNC: 13.9 G/DL
IMM GRANULOCYTES # BLD AUTO: 0.02 X10(3) UL (ref 0–1)
IMM GRANULOCYTES NFR BLD: 0.3 %
LYMPHOCYTES # BLD AUTO: 0.98 X10(3) UL (ref 1–4)
LYMPHOCYTES NFR BLD AUTO: 16.9 %
MCH RBC QN AUTO: 29.3 PG (ref 26–34)
MCHC RBC AUTO-ENTMCNC: 34 G/DL (ref 31–37)
MCV RBC AUTO: 86.1 FL
MONOCYTES # BLD AUTO: 0.81 X10(3) UL (ref 0.1–1)
MONOCYTES NFR BLD AUTO: 14 %
NEUTROPHILS # BLD AUTO: 3.41 X10 (3) UL (ref 1.5–7.7)
NEUTROPHILS # BLD AUTO: 3.41 X10(3) UL (ref 1.5–7.7)
NEUTROPHILS NFR BLD AUTO: 58.8 %
OSMOLALITY SERPL CALC.SUM OF ELEC: 289 MOSM/KG (ref 275–295)
PLATELET # BLD AUTO: 190 10(3)UL (ref 150–450)
POTASSIUM SERPL-SCNC: 4.3 MMOL/L (ref 3.5–5.1)
PROT SERPL-MCNC: 6.5 G/DL (ref 5.7–8.2)
RBC # BLD AUTO: 4.75 X10(6)UL
SODIUM SERPL-SCNC: 139 MMOL/L (ref 136–145)
WBC # BLD AUTO: 5.8 X10(3) UL (ref 4–11)

## 2024-07-17 PROCEDURE — 85025 COMPLETE CBC W/AUTO DIFF WBC: CPT | Performed by: INTERNAL MEDICINE

## 2024-07-17 PROCEDURE — 96376 TX/PRO/DX INJ SAME DRUG ADON: CPT

## 2024-07-17 PROCEDURE — 80053 COMPREHEN METABOLIC PANEL: CPT | Performed by: INTERNAL MEDICINE

## 2024-07-17 PROCEDURE — 86140 C-REACTIVE PROTEIN: CPT | Performed by: INTERNAL MEDICINE

## 2024-07-17 RX ORDER — ACETAMINOPHEN 500 MG
1000 TABLET ORAL EVERY 6 HOURS PRN
Status: SHIPPED | COMMUNITY
Start: 2024-07-17

## 2024-07-17 RX ORDER — IBUPROFEN 200 MG
400 TABLET ORAL EVERY 6 HOURS PRN
Status: SHIPPED | COMMUNITY
Start: 2024-07-17

## 2024-07-17 NOTE — PLAN OF CARE
Patient is alert and oriented x 4. Vitals stable on room air. Denies pain. Tolerating regular diet. Plan for blood cultures pending & IV abx. Plan of care discussed with patient.

## 2024-07-17 NOTE — PROGRESS NOTES
NURSING DISCHARGE NOTE    Discharged Home via Ambulatory.  Accompanied by Family member  Belongings Taken by patient/family.    AVS reviewed with patient and spouse. Reviewed instructions, restrictions, medications and follow up appointments. All questions answered, verbalized understanding.

## 2024-07-17 NOTE — PROGRESS NOTES
Patient admitted via bed from ED. Oriented to room. Safety precautions initiated. Call light in reach.

## 2024-07-17 NOTE — PLAN OF CARE
A&Ox4, VSS on room air. Redness and warmth noted to LLE, mild pitting edema. Denies pain or discomfort. Is able to walk freely with no limitations. Tolerating IV ABX. Possible DC later today pending preliminary results. POC discussed with patient.

## 2024-07-17 NOTE — H&P
[facility]   part of [facility]    History & Physical    Zane Arellano Patient Status:  Observation    1975 MRN [id number]   Location [facility] 3SW-A Attending Susan Bullard MD   Hosp Day # 0 PCP None Pcp     Date:  2024  Date of Admission:  2024    Chief Complaint:     Chief Complaint   Patient presents with    Cellulitis       HPI:   Zane Arellano is a(n) 49 year old male who presented w/ left lower extremity swelling, redness, pain. Onset was , he was doing yard work, unsure if he had a bug bite or got stuck by a thorn. Started having fevers, chills, malaise, then noted worsening redness, swelling, shin pains, started keflex, took 3 doses but noted a red line going up his leg, came to ED, was admitted and started on ancef, erythema, swelling, pain, and red streak up his leg is all improving.     History   History reviewed. No pertinent past medical history.  Past Surgical History:   Procedure Laterality Date    Other Right     bicep repair    Other surgical history  2015    Right bicep repair     History reviewed. No pertinent family history.  Social History:  Social History     Socioeconomic History    Marital status: Single   Tobacco Use    Smoking status: Never    Smokeless tobacco: Never   Substance and Sexual Activity    Alcohol use: Yes     Comment: 3-6 drinks weekly    Drug use: No     Social Determinants of Health     Food Insecurity: No Food Insecurity (2024)    Food Insecurity     Food Insecurity: Never true   Transportation Needs: No Transportation Needs (2024)    Transportation Needs     Lack of Transportation: No   Housing Stability: Low Risk  (2024)    Housing Stability     Housing Instability: No       Fam Hx  - father had brain mass removed and dvt post op     Allergies/Medications:   Allergies:   Allergies   Allergen Reactions    Seasonal      Medications Prior to Admission   Medication Sig    cephalexin 500 MG Oral Cap Take 1  capsule (500 mg total) by mouth every 6 (six) hours. for 10 days       Review of Systems:   A comprehensive 12 point review of systems was otherwise negative, aside from what's stated above.    Physical Exam:   Vital Signs:  Blood pressure 113/66, pulse 50, temperature 98.5 °F (36.9 °C), temperature source Oral, resp. rate 20, height 6' (1.829 m), weight 205 lb (93 kg), SpO2 94%.     General: No acute distress. Alert and oriented x 3.  HEENT: Moist mucous membranes. EOM-I. PERRL  Neck: No lymphadenopathy.  No JVD. No carotid bruits.  Respiratory: Clear to auscultation bilaterally.  No wheezes. No rhonchi.  Cardiovascular: S1, S2.  Regular rate and rhythm.  No murmurs. Equal pulses   Abdomen: Soft, nontender, nondistended.  Positive bowel sounds. No rebound tenderness  Neurologic: No focal neurological deficits.  Musculoskeletal: left shin erythema retracting from border, min ttp, no fluctuance   Integument: No lesions. No erythema.  Psychiatric: Appropriate mood and affect.    Results:     Lab Results   Component Value Date    WBC 5.8 07/17/2024    HGB 13.9 07/17/2024    HCT 40.9 07/17/2024    .0 07/17/2024    CREATSERUM 1.18 07/17/2024    BUN 13 07/17/2024     07/17/2024    K 4.3 07/17/2024     07/17/2024    CO2 29.0 07/17/2024     (H) 07/17/2024    CA 8.8 07/17/2024    ALB 3.9 07/17/2024    ALKPHO 67 07/17/2024    BILT 0.4 07/17/2024    TP 6.5 07/17/2024    AST 14 07/17/2024    ALT 14 07/17/2024    CRP 3.60 (H) 07/17/2024            No results found.        Assessment/Plan:     Zane Arellano is a(n) 49 year old male who presented w/ left lower extremity swelling, redness, pain.    Left lower extremity cellulitis w/ lymphangitis   Fevers   - improving on ancef   - was given keflex, took 3 doses, unlikely treatment failure   - likely strep organism, no purulent drainage   - no leukocytosis, crp minimally elevated  - monitor blood cultures until this afternoon, if remain negative, will  plan to dc home later today     DVT ppx: heparin  Code Status: full    Dispo: as above    Hugh Quezada DO  [facility]        Update   - no fevers, crp low, no leukocytosis, will plan on ctx x 1, then dc home w/ restarting keflex tomorrow   - pcp f/u within one week     Hugh Quezada DO

## 2024-07-17 NOTE — DISCHARGE INSTRUCTIONS
You can restart the keflex tomorrow - take 500mg 4 times per day -     Follow up with Dr. Marroquin within one week     For pains you can take tylenol or ibuprofen as needed     Keep your left leg elevated     When you drive to [place], try to stop half way and walk around, pump your left leg / foot in the car as well to keep it moving and reduce risk for blood clot     We will monitor your blood cultures and if one returns positive, we will call you and then have to re admit you

## (undated) NOTE — ED AVS SNAPSHOT
Ezra Marks   MRN: [id number]    Department:  [facility] Emergency Department   Date of Visit:  8/6/2018           Disclosure     Insurance plans vary and the physician(s) referred by the ER may not be covered by your plan.  Please contact your tell this physician (or your personal doctor if your instructions are to return to your personal doctor) about any new or lasting problems. The primary care or specialist physician will see patients referred from the [facility] Emergency Department.  Lesli Hansen